# Patient Record
Sex: FEMALE | Race: ASIAN | NOT HISPANIC OR LATINO | URBAN - METROPOLITAN AREA
[De-identification: names, ages, dates, MRNs, and addresses within clinical notes are randomized per-mention and may not be internally consistent; named-entity substitution may affect disease eponyms.]

---

## 2021-04-21 ENCOUNTER — INPATIENT (INPATIENT)
Facility: HOSPITAL | Age: 51
LOS: 0 days | Discharge: HOME | DRG: 156 | End: 2021-04-21
Attending: INTERNAL MEDICINE | Admitting: INTERNAL MEDICINE
Payer: COMMERCIAL

## 2021-04-21 VITALS
DIASTOLIC BLOOD PRESSURE: 79 MMHG | RESPIRATION RATE: 16 BRPM | SYSTOLIC BLOOD PRESSURE: 114 MMHG | HEART RATE: 69 BPM | OXYGEN SATURATION: 100 % | TEMPERATURE: 98 F

## 2021-04-21 DIAGNOSIS — E78.5 HYPERLIPIDEMIA, UNSPECIFIED: ICD-10-CM

## 2021-04-21 DIAGNOSIS — T18.9XXA FOREIGN BODY OF ALIMENTARY TRACT, PART UNSPECIFIED, INITIAL ENCOUNTER: ICD-10-CM

## 2021-04-21 DIAGNOSIS — Z29.9 ENCOUNTER FOR PROPHYLACTIC MEASURES, UNSPECIFIED: ICD-10-CM

## 2021-04-21 LAB
ALBUMIN SERPL ELPH-MCNC: 4 G/DL — SIGNIFICANT CHANGE UP (ref 3.5–5)
ALP SERPL-CCNC: 74 U/L — SIGNIFICANT CHANGE UP (ref 40–120)
ALT FLD-CCNC: 18 U/L DA — SIGNIFICANT CHANGE UP (ref 10–60)
ANION GAP SERPL CALC-SCNC: 6 MMOL/L — SIGNIFICANT CHANGE UP (ref 5–17)
AST SERPL-CCNC: 14 U/L — SIGNIFICANT CHANGE UP (ref 10–40)
BASOPHILS # BLD AUTO: 0.02 K/UL — SIGNIFICANT CHANGE UP (ref 0–0.2)
BASOPHILS NFR BLD AUTO: 0.2 % — SIGNIFICANT CHANGE UP (ref 0–2)
BILIRUB SERPL-MCNC: 0.6 MG/DL — SIGNIFICANT CHANGE UP (ref 0.2–1.2)
BUN SERPL-MCNC: 10 MG/DL — SIGNIFICANT CHANGE UP (ref 7–18)
CALCIUM SERPL-MCNC: 8.8 MG/DL — SIGNIFICANT CHANGE UP (ref 8.4–10.5)
CHLORIDE SERPL-SCNC: 106 MMOL/L — SIGNIFICANT CHANGE UP (ref 96–108)
CO2 SERPL-SCNC: 25 MMOL/L — SIGNIFICANT CHANGE UP (ref 22–31)
CREAT SERPL-MCNC: 0.61 MG/DL — SIGNIFICANT CHANGE UP (ref 0.5–1.3)
EOSINOPHIL # BLD AUTO: 0.09 K/UL — SIGNIFICANT CHANGE UP (ref 0–0.5)
EOSINOPHIL NFR BLD AUTO: 1 % — SIGNIFICANT CHANGE UP (ref 0–6)
GLUCOSE SERPL-MCNC: 82 MG/DL — SIGNIFICANT CHANGE UP (ref 70–99)
HCG UR QL: NEGATIVE — SIGNIFICANT CHANGE UP
HCT VFR BLD CALC: 39.7 % — SIGNIFICANT CHANGE UP (ref 34.5–45)
HGB BLD-MCNC: 12.9 G/DL — SIGNIFICANT CHANGE UP (ref 11.5–15.5)
IMM GRANULOCYTES NFR BLD AUTO: 0.3 % — SIGNIFICANT CHANGE UP (ref 0–1.5)
LYMPHOCYTES # BLD AUTO: 3.38 K/UL — HIGH (ref 1–3.3)
LYMPHOCYTES # BLD AUTO: 37.2 % — SIGNIFICANT CHANGE UP (ref 13–44)
MCHC RBC-ENTMCNC: 28.7 PG — SIGNIFICANT CHANGE UP (ref 27–34)
MCHC RBC-ENTMCNC: 32.5 GM/DL — SIGNIFICANT CHANGE UP (ref 32–36)
MCV RBC AUTO: 88.4 FL — SIGNIFICANT CHANGE UP (ref 80–100)
MONOCYTES # BLD AUTO: 0.62 K/UL — SIGNIFICANT CHANGE UP (ref 0–0.9)
MONOCYTES NFR BLD AUTO: 6.8 % — SIGNIFICANT CHANGE UP (ref 2–14)
NEUTROPHILS # BLD AUTO: 4.94 K/UL — SIGNIFICANT CHANGE UP (ref 1.8–7.4)
NEUTROPHILS NFR BLD AUTO: 54.5 % — SIGNIFICANT CHANGE UP (ref 43–77)
NRBC # BLD: 0 /100 WBCS — SIGNIFICANT CHANGE UP (ref 0–0)
PLATELET # BLD AUTO: 333 K/UL — SIGNIFICANT CHANGE UP (ref 150–400)
POTASSIUM SERPL-MCNC: 3.8 MMOL/L — SIGNIFICANT CHANGE UP (ref 3.5–5.3)
POTASSIUM SERPL-SCNC: 3.8 MMOL/L — SIGNIFICANT CHANGE UP (ref 3.5–5.3)
PROT SERPL-MCNC: 7.6 G/DL — SIGNIFICANT CHANGE UP (ref 6–8.3)
RBC # BLD: 4.49 M/UL — SIGNIFICANT CHANGE UP (ref 3.8–5.2)
RBC # FLD: 12.4 % — SIGNIFICANT CHANGE UP (ref 10.3–14.5)
SARS-COV-2 RNA SPEC QL NAA+PROBE: SIGNIFICANT CHANGE UP
SODIUM SERPL-SCNC: 137 MMOL/L — SIGNIFICANT CHANGE UP (ref 135–145)
WBC # BLD: 9.08 K/UL — SIGNIFICANT CHANGE UP (ref 3.8–10.5)
WBC # FLD AUTO: 9.08 K/UL — SIGNIFICANT CHANGE UP (ref 3.8–10.5)

## 2021-04-21 PROCEDURE — 87635 SARS-COV-2 COVID-19 AMP PRB: CPT

## 2021-04-21 PROCEDURE — 99285 EMERGENCY DEPT VISIT HI MDM: CPT

## 2021-04-21 PROCEDURE — 71046 X-RAY EXAM CHEST 2 VIEWS: CPT

## 2021-04-21 PROCEDURE — 85025 COMPLETE CBC W/AUTO DIFF WBC: CPT

## 2021-04-21 PROCEDURE — 93005 ELECTROCARDIOGRAM TRACING: CPT

## 2021-04-21 PROCEDURE — 71046 X-RAY EXAM CHEST 2 VIEWS: CPT | Mod: 26

## 2021-04-21 PROCEDURE — 99221 1ST HOSP IP/OBS SF/LOW 40: CPT | Mod: 25

## 2021-04-21 PROCEDURE — 88312 SPECIAL STAINS GROUP 1: CPT

## 2021-04-21 PROCEDURE — 88312 SPECIAL STAINS GROUP 1: CPT | Mod: 26

## 2021-04-21 PROCEDURE — 99222 1ST HOSP IP/OBS MODERATE 55: CPT | Mod: GC

## 2021-04-21 PROCEDURE — 43235 EGD DIAGNOSTIC BRUSH WASH: CPT

## 2021-04-21 PROCEDURE — 81025 URINE PREGNANCY TEST: CPT

## 2021-04-21 PROCEDURE — 88305 TISSUE EXAM BY PATHOLOGIST: CPT

## 2021-04-21 PROCEDURE — 36415 COLL VENOUS BLD VENIPUNCTURE: CPT

## 2021-04-21 PROCEDURE — 88305 TISSUE EXAM BY PATHOLOGIST: CPT | Mod: 26

## 2021-04-21 PROCEDURE — 80053 COMPREHEN METABOLIC PANEL: CPT

## 2021-04-21 RX ORDER — LIDOCAINE 4 G/100G
5 CREAM TOPICAL
Qty: 40 | Refills: 0
Start: 2021-04-21 | End: 2021-04-22

## 2021-04-21 RX ORDER — ATORVASTATIN CALCIUM 80 MG/1
1 TABLET, FILM COATED ORAL
Qty: 0 | Refills: 0 | DISCHARGE

## 2021-04-21 RX ORDER — PANTOPRAZOLE SODIUM 20 MG/1
1 TABLET, DELAYED RELEASE ORAL
Qty: 14 | Refills: 0
Start: 2021-04-21 | End: 2021-05-04

## 2021-04-21 RX ORDER — LIDOCAINE 4 G/100G
5 CREAM TOPICAL ONCE
Refills: 0 | Status: COMPLETED | OUTPATIENT
Start: 2021-04-21 | End: 2021-04-21

## 2021-04-21 RX ORDER — SUCRALFATE 1 G
1 TABLET ORAL
Qty: 56 | Refills: 0
Start: 2021-04-21 | End: 2021-05-04

## 2021-04-21 RX ORDER — CHOLECALCIFEROL (VITAMIN D3) 125 MCG
1 CAPSULE ORAL
Qty: 0 | Refills: 0 | DISCHARGE

## 2021-04-21 RX ADMIN — Medication 30 MILLILITER(S): at 11:52

## 2021-04-21 RX ADMIN — LIDOCAINE 5 MILLILITER(S): 4 CREAM TOPICAL at 11:52

## 2021-04-21 NOTE — DISCHARGE NOTE PROVIDER - NSDCMRMEDTOKEN_GEN_ALL_CORE_FT
Lipitor 10 mg oral tablet: 1 tab(s) orally once a day  Protonix 40 mg oral delayed release tablet: 1 tab(s) orally once a day   sucralfate 1 g oral tablet: 1 tab(s) orally 4 times a day   Vitamin D3 50,000 intl units (1250 mcg) oral capsule: 1 cap(s) orally once a week

## 2021-04-21 NOTE — ED PROVIDER NOTE - OBJECTIVE STATEMENT
50yoF prev healthy presents with feeling of fish bone stuck in her mid-chest, sharp/jabbing feeling. Onset yesterday evening while rushing to eat fish, felt fishbone going down her throat and down to chest, worsened with specific position changes and worsened with swallowing. Denies SOB, vomiting, and all other symptoms. Has never had swallowing issues before. 50yoF prev healthy presents with feeling of fish bone stuck in her mid-chest, sharp/jabbing feeling. Onset yesterday evening while rushing to eat fish, felt fishbone going down her throat and down to chest, worsened with specific position changes and worsened with swallowing. She had a cup of coffee and slice of bread today 830AM without aspiration. Denies SOB, vomiting, and all other symptoms. Has never had swallowing issues before.

## 2021-04-21 NOTE — ED PROVIDER NOTE - PHYSICAL EXAMINATION
Afebrile, hemodynamically stable, saturating well  NAD, well appearing, walking and sitting comfortably in ED  Speaking full sentences  No stridor, hoarseness, or salivary pooling  No neck crepitus/bruising  Head NCAT  EOMI grossly  MMM  RRR, nml S1/S2, no m/r/g  Lungs CTAB, no w/r/r  Abd soft, entirely NT, ND, nml BS, no rebound or guarding  AAO, CN's 3-12 grossly intact  TALBOT spontaneously, no leg cyanosis or edema  Skin warm, well perfused, no rashes or hive

## 2021-04-21 NOTE — DISCHARGE NOTE PROVIDER - PROVIDER TOKENS
PROVIDER:[TOKEN:[[id number]],FOLLOWUP:[1 week]] PROVIDER:[TOKEN:[[id number]],FOLLOWUP:[1 week]],PROVIDER:[TOKEN:[[id number]]]

## 2021-04-21 NOTE — DISCHARGE NOTE PROVIDER - NSDCFUADDINST_GEN_ALL_CORE_FT
Follow up with GI Dr. Singh in 7 to 10 days.  Follow up the biopsy of polyps removed.  Follow up with GI Dr. Singh in 7 to 10 days.  Follow up the biopsy of polyps removed.     You may stop medications Carafate and Protonix after 10 days if no new symptoms

## 2021-04-21 NOTE — H&P ADULT - ATTENDING COMMENTS
Patient seen and examined in Endoscopy suite.    50 yr F with PMH of HLD presents to ED complaining of foreign body sensation in throat and upper chest especially on lying down. feeling of fish bone stuck in her mid-chest. Pt reports sharp epigastric discomfort/pain since yesterday afternoon at 12:30 while rushing to eat fish. She felt fishbone going down her throat and down to chest. Pt reports her epigastric discomfort gets worsened with specific position changes like laying down and with swallowing.     Pt denies any dysphagia, SOB, cough, abdominal pain or any other acute complaints. All other ROS negative    Pt is admitted due to foreign body sensation seen by GI and undergoing emergent Endoscopy    Vital Signs Last 24 Hrs  T(C): 36.8 (21 Apr 2021 10:53), Max: 36.8 (21 Apr 2021 10:53)  T(F): 98.2 (21 Apr 2021 10:53), Max: 98.2 (21 Apr 2021 10:53)  HR: 69 (21 Apr 2021 10:53) (69 - 69)  BP: 114/79 (21 Apr 2021 10:53) (114/79 - 114/79)  RR: 16 (21 Apr 2021 10:53) (16 - 16)  SpO2: 100% (21 Apr 2021 10:53) (100% - 100%) Patient seen and examined in Endoscopy suite.    50 yr F with PMH of HLD presents to ED complaining of foreign body sensation in throat and upper chest especially on lying down. feeling of fish bone stuck in her mid-chest. Pt reports sharp epigastric discomfort/pain since yesterday afternoon at 12:30 while rushing to eat fish. She felt fishbone going down her throat and down to chest. Pt reports her epigastric discomfort gets worsened with specific position changes like laying down and with swallowing.     Pt denies any dysphagia, SOB, cough, abdominal pain or any other acute complaints. All other ROS negative    Pt is admitted due to foreign body sensation seen by GI and undergoing emergent Endoscopy    Vital Signs Last 24 Hrs  T(C): 36.8 (21 Apr 2021 10:53), Max: 36.8 (21 Apr 2021 10:53)  T(F): 98.2 (21 Apr 2021 10:53), Max: 98.2 (21 Apr 2021 10:53)  HR: 69 (21 Apr 2021 10:53) (69 - 69)  BP: 114/79 (21 Apr 2021 10:53) (114/79 - 114/79)  RR: 16 (21 Apr 2021 10:53) (16 - 16)  SpO2: 100% (21 Apr 2021 10:53) (100% - 100%)    P/E:  Neuro: AAO x3; No focal deficits  CVS: S1S2 present, regular  Resp: BLAE+, No wheeze or Rhonchi  GI: Soft, BS+, NT  Extr: No edema or calf tenderness    Labs:                        12.9   9.08  )-----------( 333      ( 21 Apr 2021 13:35 )             39.7   04-21    137  |  106  |  10  ----------------------------<  82  3.8   |  25  |  0.61    Ca    8.8      21 Apr 2021 13:35    TPro  7.6  /  Alb  4.0  /  TBili  0.6  /  DBili  x   /  AST  14  /  ALT  18  /  AlkPhos  74  04-21    Xray Chest 2 Views PA/Lat (04.21.21 @ 11:37)     Frontal and lateral views of the chest were obtained with suboptimal inspiration. With allowance for this, the heart is normal in size and the lungs are clear. There is no evidence of pneumothorax nor pleural effusion.    IMPRESSION: No radiopaque foreign body.    D/D:  Foreign body sensation likely impacted fishbone   Hx Hyperlipidemia    Plan:  Admit to medicine  NPO  GI consult appreciated; d/w Dr. Singh for EGD and FB removal   Patient does not recall pill for elevated cholesterol as she takes it infrequently    Discussed with patient findings and plan of care  Discussed with GI Dr. Singh  Discussed with PGY2 MAR Dr. Lui

## 2021-04-21 NOTE — ED PROVIDER NOTE - CLINICAL SUMMARY MEDICAL DECISION MAKING FREE TEXT BOX
Character low suspicion for ACS and ECG _________. Character low suspicion for PE and no risk factors or e/o DVT. No abdominal pain/tenderness, no neck crepitus and character low suspicion for perforation. Character low suspicion for ACS and ECG unremarkable. Character low suspicion for PE and no risk factors or e/o DVT. No abdominal pain/tenderness, no neck crepitus and character low suspicion for perforation, no free air on CXR. Character low suspicion for ACS and ECG unremarkable. Character low suspicion for PE and no risk factors or e/o DVT. No abdominal pain/tenderness, no neck crepitus and character low suspicion for perforation, no free air on CXR. Seen by GI and going to endoscopy now. Character low suspicion for ACS and ECG unremarkable. Character low suspicion for PE and no risk factors or e/o DVT. No abdominal pain/tenderness, no neck crepitus and character low suspicion for perforation, no free air on CXR. Seen by GI and going to endoscopy now. Patient well appearing, hemodynamically stable. Admitted to internal medicine for further monitoring, w/u, and care.

## 2021-04-21 NOTE — PROGRESS NOTE ADULT - SUBJECTIVE AND OBJECTIVE BOX
Esophagogastroduodenoscopy Report  Indication: R/O bone stuck in esopghagus  Referring MD:   Instrument:  #[id number]  Anesthesia: MAC  Consent:  Informed consent was obtained from the patient after providing any opportunity for questions  Procedure: The gastroscope was gently passed through the incisoral orifice into the oral cavity and under direct visualization the esophagus was intubated. The endoscope was passed down the esophagus, through the stomach and into proximal jejunum. Color, texture, mucosa and anatomy of the esophagus, stomach, and duodenum were carefully examined with the scope. The patient tolerated the procedure well. After completion of the examination, the patient was transferred to the recovery room.   Preparation: NPO   Findings:   Oropharynx	Normal, no bones seen  Esophagus	Clear trauma with focal ulceration about 32 cm from incisors  EG-junction	Normal  Cardia	Normal.  Body	Normal except some diminutive polyps biopsied   Antrum	Normal  Pylorus	Normal.  Duodenal Bulb	Normal   2nd portion	Normal  3rd portion	Normal.  Date and time:    EBL:0  Impression:  Gastric polyps                     Trauma of distal esophagus likely from a foreign body injury    Plan: Suggest carafate or viscous lidocaine for relief          PPI          F/U PRN                              Procedure Start Time: 16:12  Procedure End Time:   16:22                                  Attending: Pancho Singh MD

## 2021-04-21 NOTE — DISCHARGE NOTE PROVIDER - NSDCCPCAREPLAN_GEN_ALL_CORE_FT
PRINCIPAL DISCHARGE DIAGNOSIS  Diagnosis: Swallowed foreign body, initial encounter  Assessment and Plan of Treatment: You presented with foreign body sensation and sharp pain in ypur upper abdomen after swallowing fish bone yesterday. You got Endoscopy today which did not show foreign body but showed gastric polyps and Trauma of distal esophagus likely from a foreign body injury  Gastroenetrologist Dr. Singh recommended protonix every morning once a day and sucralfate 1gm 4 times a day for 2 weeks. Follow with your PCP.      SECONDARY DISCHARGE DIAGNOSES  Diagnosis: Hyperlipidemia  Assessment and Plan of Treatment: Continue with atorvasttain 10 mg every day. Eat healthy diet rich in fruits and vegetables. Avoid fatty diet.Excercise regularly.See your PCP regularly.    Diagnosis: Vitamin D deficiency  Assessment and Plan of Treatment: Continue with Vitamin D 50,000 units weekly.     PRINCIPAL DISCHARGE DIAGNOSIS  Diagnosis: Swallowed foreign body, initial encounter  Assessment and Plan of Treatment: You presented with foreign body sensation and sharp pain in ypur upper abdomen after swallowing fish bone yesterday. You got Endoscopy today which did not show foreign body but showed gastric polyps and Trauma and scarring of distal esophagus likely from a foreign body injury possibly fishbone passing through.   Gastroenetrologist Dr. Singh recommended protonix every morning once a day and sucralfate 1 gm 4 times a day for 2 weeks. Follow with your PCP.      SECONDARY DISCHARGE DIAGNOSES  Diagnosis: Hyperlipidemia  Assessment and Plan of Treatment: You have a history of elevated cholesterol.   Continue with atorvastain 10 mg every night as advised by your PCP. Eat healthy diet rich in fruits and vegetables. Avoid fatty diet.Excercise regularly 30 mins daily at least 5 days a week. See your PCP regularly.    Diagnosis: Vitamin D deficiency  Assessment and Plan of Treatment: Continue with Vitamin D 50,000 units weekly. PLEASE CHECK THE LEVELS AFTER 8 WEEKS OF REGULAR USE. Once levels more than 30, your PCP may switch you to 1000 to 2000 units daily over the counter dose.

## 2021-04-21 NOTE — ED ADULT NURSE NOTE - OBJECTIVE STATEMENT
Pt c/o fish bone stuck since yesterday afternoon, denies any difficulty breathing. No acute distress noted, denies chest pain, no shortness of breath indicated.

## 2021-04-21 NOTE — H&P ADULT - HISTORY OF PRESENT ILLNESS
50 yr F with PMH of HLD presents to ED complaining of feeling of fish bone stuck in her mid-chest. Pt reports sharp epigastric discomfort/pain since yesterday afternoon at 12:30 while rushing to eat fish. She felt fishbone going down her throat and down to chest. Pt reports her epigastric discomfort gets worsened with specific position changes like laying down and with swallowing. Pt denies any dysphagia, SOB, cough, abdominal pain or any other acute complaints.

## 2021-04-21 NOTE — H&P ADULT - PROBLEM SELECTOR PLAN 1
p/w sharp epigastric pain/ discomfort after swallowing fish bone  Discomfort gets worse with laying down or swallowing food  CXR doesn't show any radiopaque foreign body   NPO for now  Pt will get Endoscopy for possible FB removal  GI Dr. Singh

## 2021-04-21 NOTE — DISCHARGE NOTE PROVIDER - CARE PROVIDER_API CALL
Pancho Singh)  Gastroenterology  [address] [place] [address]  Phone: [phone]  Fax: [phone]  Follow Up Time: 1 week   Pancho Singh)  Gastroenterology  [address] [place] [address]  Phone: [phone]  Fax: [phone]  Follow Up Time: 1 week    VELVET PAARICIO  [id number]  [address] [place] [address]  Phone: ()-  Fax: ()-  Follow Up Time:

## 2021-04-21 NOTE — CONSULT NOTE ADULT - SUBJECTIVE AND OBJECTIVE BOX
[facility]    Patient is a 50y old  Female who presents with a chief complaint of   HPI:    PMH/PSH:  PAST MEDICAL & SURGICAL HISTORY:    FH:  FAMILY HISTORY:      MEDS:  MEDICATIONS  (STANDING):    MEDICATIONS  (PRN):    Allergies    No Known Allergies    Intolerances      GI HPI:     51 yo female with past medical hx of HLD on cholesterol lowering agent but does not remember name. Presents to ED c/o feeling of fish bone stuck in her mid-chest, sharp/jabbing feeling. Onset yesterday evening while rushing to eat fish, felt fishbone going down her throat and down to chest, worsened with specific position changes and worsened with swallowing. She states she ate a small piece of bread with a cup of coffee. Denies chest pain, wheezing, vomiting, or SOB. CXR does not show any radiopaque foreign body. GI consulted for evaluation of foreign body via scope.        CONSTITUTIONAL:  No weight loss, fever, chills, weakness or fatigue.  HEENT:  Eyes:  No visual loss, blurred vision, double vision or yellow sclerae. Ears, Nose, Throat:  No hearing loss, sneezing, congestion, runny nose or sore throat.  SKIN:  No rash or itching.  CARDIOVASCULAR:  No chest pain, chest pressure or chest discomfort. No palpitations or edema.  RESPIRATORY:  No shortness of breath, cough or sputum.  GASTROINTESTINAL:  SEE HPI  GENITOURINARY:  No dysuria, hematuria, urinary frequency  NEUROLOGICAL:  No headache, dizziness, syncope, paralysis, ataxia, numbness or tingling in the extremities. No change in bowel or bladder control.  MUSCULOSKELETAL:  No muscle, back pain, joint pain or stiffness.  HEMATOLOGIC:  No anemia, bleeding or bruising.  LYMPHATICS:  No enlarged nodes. No history of splenectomy.  PSYCHIATRIC:  No history of depression or anxiety.  ENDOCRINOLOGIC:  No reports of sweating, cold or heat intolerance. No polyuria or polydipsia.      ______________________________________________________________________  PHYSICAL EXAM:  T(C): 36.8 (04-21-21 @ 10:53), Max: 36.8 (04-21-21 @ 10:53)  HR: 69 (04-21-21 @ 10:53)  BP: 114/79 (04-21-21 @ 10:53)  RR: 16 (04-21-21 @ 10:53)  SpO2: 100% (04-21-21 @ 10:53)  Wt(kg): --      GEN: NAD, normocephalic  CVS: S1S2+  CHEST: clear to auscultation  ABD: soft , nontender, nondistended, bowel sounds present  EXTR: no cyanosis, no clubbing, no edema  NEURO: Awake and alert; oriented to person, place, time and situation   SKIN:  warm;  non icteric    ______________________________________________________________________  LABS:                        12.9   9.08  )-----------( 333      ( 21 Apr 2021 13:35 )             39.7

## 2021-04-21 NOTE — H&P ADULT - PROBLEM SELECTOR PLAN 3
RISK                                                          Points  [  ] Previous VTE                                                3  [  ] Thrombophilia                                             2  [  ] Lower limb paralysis                                   2        (unable to hold up >15 seconds)    [  ] Current Cancer                                             2         (within 6 months)  [ x ] Immobilization > 24 hrs                              1  [  ] ICU/CCU stay > 24 hours                             1  [  ] Age > 60                                                         1    IMPROVE VTE Score: 1  No indication for VTE prophylaxis.

## 2021-04-21 NOTE — H&P ADULT - ASSESSMENT
50 yr F with PMH of HLD presents to ED complaining of feeling of fish bone stuck in her mid-chest. Pt reports sharp epigastric discomfort/pain since yesterday afternoon at 12:30 while rushing to eat fish. She felt fishbone going down her throat and down to chest. Pt reports her epigastric discomfort gets worsened with specific position changes like laying down and with swallowing. Pt denies any dysphagia, SOB, cough, abdominal pain or any other acute complaints.      Pt is admitted due to foreign body sensation

## 2021-04-21 NOTE — DISCHARGE NOTE NURSING/CASE MANAGEMENT/SOCIAL WORK - PATIENT PORTAL LINK FT
You can access the FollowMyHealth Patient Portal offered by [facility] by registering at the following website: [email or web address]. By joining [facility]’s FollowMyHealth portal, you will also be able to view your health information using other applications (apps) compatible with our system.
no

## 2021-04-21 NOTE — DISCHARGE NOTE PROVIDER - HOSPITAL COURSE
50 yr F with PMH of HLD presents to ED complaining of feeling of fish bone stuck in her mid-chest. Pt reports sharp epigastric discomfort/pain since yesterday afternoon at 12:30 while rushing to eat fish. She felt fishbone going down her throat and down to chest. Pt reports her epigastric discomfort gets worsened with specific position changes like laying down and with swallowing.    Pt got Endoscopy which did not show foreign body but it showed Gastric Polyps and Trauma of distal esophagus likely from a foreign body injury  Pt is recommended PPI, viscous lidocaine and sucralfate for 2weeks.    Pt is stable for discharge.

## 2021-04-23 LAB — SURGICAL PATHOLOGY STUDY: SIGNIFICANT CHANGE UP

## 2023-01-04 NOTE — DISCHARGE NOTE NURSING/CASE MANAGEMENT/SOCIAL WORK - NSDCPETBCESMAN_GEN_ALL_CORE
If you are a smoker, it is important for your health to stop smoking. Please be aware that second hand smoke is also harmful. Date Of Previous Biopsy (Optional): 11/22/22

## 2023-05-17 NOTE — ED PROVIDER NOTE - NS ED MD DISPO SPECIAL CONSIDERATION1
Take antibiotics as prescribed until complete.  Alternate Tylenol and Motrin as needed for pain and/or fever.  Keep patient well hydrated drinking plenty of water daily.  Follow up with pediatrician within 2-3 days.  
None